# Patient Record
Sex: MALE | Race: WHITE | NOT HISPANIC OR LATINO | Employment: FULL TIME | URBAN - METROPOLITAN AREA
[De-identification: names, ages, dates, MRNs, and addresses within clinical notes are randomized per-mention and may not be internally consistent; named-entity substitution may affect disease eponyms.]

---

## 2017-01-07 DIAGNOSIS — D75.1 SECONDARY POLYCYTHEMIA: ICD-10-CM

## 2017-02-03 ENCOUNTER — APPOINTMENT (OUTPATIENT)
Dept: LAB | Facility: CLINIC | Age: 57
End: 2017-02-03
Payer: COMMERCIAL

## 2017-02-03 DIAGNOSIS — E61.1 IRON DEFICIENCY: ICD-10-CM

## 2017-02-03 DIAGNOSIS — D75.1 SECONDARY POLYCYTHEMIA: ICD-10-CM

## 2017-02-03 LAB
ALBUMIN SERPL BCP-MCNC: 4.2 G/DL (ref 3.5–5)
ALP SERPL-CCNC: 67 U/L (ref 46–116)
ALT SERPL W P-5'-P-CCNC: 27 U/L (ref 12–78)
ANION GAP SERPL CALCULATED.3IONS-SCNC: 7 MMOL/L (ref 4–13)
AST SERPL W P-5'-P-CCNC: 17 U/L (ref 5–45)
BASOPHILS # BLD AUTO: 0.01 THOUSANDS/ΜL (ref 0–0.1)
BASOPHILS NFR BLD AUTO: 0 % (ref 0–1)
BILIRUB SERPL-MCNC: 0.47 MG/DL (ref 0.2–1)
BUN SERPL-MCNC: 13 MG/DL (ref 5–25)
CALCIUM SERPL-MCNC: 9 MG/DL (ref 8.3–10.1)
CHLORIDE SERPL-SCNC: 104 MMOL/L (ref 100–108)
CO2 SERPL-SCNC: 30 MMOL/L (ref 21–32)
CREAT SERPL-MCNC: 1.01 MG/DL (ref 0.6–1.3)
EOSINOPHIL # BLD AUTO: 0.08 THOUSAND/ΜL (ref 0–0.61)
EOSINOPHIL NFR BLD AUTO: 1 % (ref 0–6)
ERYTHROCYTE [DISTWIDTH] IN BLOOD BY AUTOMATED COUNT: 13.6 % (ref 11.6–15.1)
FERRITIN SERPL-MCNC: 12 NG/ML (ref 8–388)
GFR SERPL CREATININE-BSD FRML MDRD: >60 ML/MIN/1.73SQ M
GLUCOSE SERPL-MCNC: 134 MG/DL (ref 65–140)
HCT VFR BLD AUTO: 49.8 % (ref 36.5–49.3)
HGB BLD-MCNC: 17.2 G/DL (ref 12–17)
IRON SATN MFR SERPL: 35 %
IRON SERPL-MCNC: 160 UG/DL (ref 65–175)
LYMPHOCYTES # BLD AUTO: 1.6 THOUSANDS/ΜL (ref 0.6–4.47)
LYMPHOCYTES NFR BLD AUTO: 26 % (ref 14–44)
MCH RBC QN AUTO: 31 PG (ref 26.8–34.3)
MCHC RBC AUTO-ENTMCNC: 34.5 G/DL (ref 31.4–37.4)
MCV RBC AUTO: 90 FL (ref 82–98)
MONOCYTES # BLD AUTO: 0.53 THOUSAND/ΜL (ref 0.17–1.22)
MONOCYTES NFR BLD AUTO: 9 % (ref 4–12)
NEUTROPHILS # BLD AUTO: 4.03 THOUSANDS/ΜL (ref 1.85–7.62)
NEUTS SEG NFR BLD AUTO: 64 % (ref 43–75)
NRBC BLD AUTO-RTO: 0 /100 WBCS
PLATELET # BLD AUTO: 211 THOUSANDS/UL (ref 149–390)
PMV BLD AUTO: 10.9 FL (ref 8.9–12.7)
POTASSIUM SERPL-SCNC: 3.9 MMOL/L (ref 3.5–5.3)
PROT SERPL-MCNC: 7.7 G/DL (ref 6.4–8.2)
RBC # BLD AUTO: 5.54 MILLION/UL (ref 3.88–5.62)
SODIUM SERPL-SCNC: 141 MMOL/L (ref 136–145)
TIBC SERPL-MCNC: 453 UG/DL (ref 250–450)
WBC # BLD AUTO: 6.26 THOUSAND/UL (ref 4.31–10.16)

## 2017-02-03 PROCEDURE — 36415 COLL VENOUS BLD VENIPUNCTURE: CPT

## 2017-02-03 PROCEDURE — 85025 COMPLETE CBC W/AUTO DIFF WBC: CPT

## 2017-02-03 PROCEDURE — 80053 COMPREHEN METABOLIC PANEL: CPT

## 2017-02-03 PROCEDURE — 83540 ASSAY OF IRON: CPT

## 2017-02-03 PROCEDURE — 83550 IRON BINDING TEST: CPT

## 2017-02-03 PROCEDURE — 82728 ASSAY OF FERRITIN: CPT

## 2017-02-28 ENCOUNTER — GENERIC CONVERSION - ENCOUNTER (OUTPATIENT)
Dept: OTHER | Facility: OTHER | Age: 57
End: 2017-02-28

## 2017-02-28 ENCOUNTER — ALLSCRIPTS OFFICE VISIT (OUTPATIENT)
Dept: OTHER | Facility: OTHER | Age: 57
End: 2017-02-28

## 2017-02-28 DIAGNOSIS — E61.1 IRON DEFICIENCY: ICD-10-CM

## 2017-02-28 DIAGNOSIS — D75.1 SECONDARY POLYCYTHEMIA: ICD-10-CM

## 2017-04-07 DIAGNOSIS — E61.1 IRON DEFICIENCY: ICD-10-CM

## 2017-04-07 DIAGNOSIS — D75.1 SECONDARY POLYCYTHEMIA: ICD-10-CM

## 2017-06-28 DIAGNOSIS — D75.1 SECONDARY POLYCYTHEMIA: ICD-10-CM

## 2017-06-28 DIAGNOSIS — E61.1 IRON DEFICIENCY: ICD-10-CM

## 2017-08-27 DIAGNOSIS — D75.1 SECONDARY POLYCYTHEMIA: ICD-10-CM

## 2017-08-27 DIAGNOSIS — E61.1 IRON DEFICIENCY: ICD-10-CM

## 2017-10-26 DIAGNOSIS — E61.1 IRON DEFICIENCY: ICD-10-CM

## 2017-10-26 DIAGNOSIS — D75.1 SECONDARY POLYCYTHEMIA: ICD-10-CM

## 2017-12-25 DIAGNOSIS — D75.1 SECONDARY POLYCYTHEMIA: ICD-10-CM

## 2017-12-25 DIAGNOSIS — E61.1 IRON DEFICIENCY: ICD-10-CM

## 2018-01-14 VITALS
HEIGHT: 67 IN | OXYGEN SATURATION: 98 % | WEIGHT: 158.25 LBS | HEART RATE: 78 BPM | DIASTOLIC BLOOD PRESSURE: 80 MMHG | RESPIRATION RATE: 16 BRPM | TEMPERATURE: 97.6 F | SYSTOLIC BLOOD PRESSURE: 126 MMHG | BODY MASS INDEX: 24.84 KG/M2

## 2018-06-28 ENCOUNTER — LAB (OUTPATIENT)
Dept: LAB | Facility: CLINIC | Age: 58
End: 2018-06-28
Payer: COMMERCIAL

## 2018-06-28 ENCOUNTER — TELEPHONE (OUTPATIENT)
Dept: INTERNAL MEDICINE CLINIC | Facility: CLINIC | Age: 58
End: 2018-06-28

## 2018-06-28 ENCOUNTER — OFFICE VISIT (OUTPATIENT)
Dept: INTERNAL MEDICINE CLINIC | Facility: CLINIC | Age: 58
End: 2018-06-28
Payer: COMMERCIAL

## 2018-06-28 VITALS
WEIGHT: 157 LBS | HEART RATE: 77 BPM | DIASTOLIC BLOOD PRESSURE: 78 MMHG | SYSTOLIC BLOOD PRESSURE: 110 MMHG | BODY MASS INDEX: 24.64 KG/M2 | HEIGHT: 67 IN

## 2018-06-28 DIAGNOSIS — E83.19 IRON OVERLOAD: ICD-10-CM

## 2018-06-28 DIAGNOSIS — R53.83 OTHER FATIGUE: ICD-10-CM

## 2018-06-28 DIAGNOSIS — E53.8 VITAMIN B 12 DEFICIENCY: ICD-10-CM

## 2018-06-28 DIAGNOSIS — R73.01 IMPAIRED FASTING BLOOD SUGAR: ICD-10-CM

## 2018-06-28 DIAGNOSIS — Z13.6 SCREENING FOR CARDIOVASCULAR CONDITION: ICD-10-CM

## 2018-06-28 DIAGNOSIS — Z12.5 SCREENING FOR MALIGNANT NEOPLASM OF PROSTATE: ICD-10-CM

## 2018-06-28 DIAGNOSIS — H91.93 BILATERAL HEARING LOSS, UNSPECIFIED HEARING LOSS TYPE: ICD-10-CM

## 2018-06-28 DIAGNOSIS — Z11.59 NEED FOR HEPATITIS C SCREENING TEST: ICD-10-CM

## 2018-06-28 DIAGNOSIS — Z11.59 NEED FOR HEPATITIS C SCREENING TEST: Primary | ICD-10-CM

## 2018-06-28 DIAGNOSIS — R13.0 APHAGIA: ICD-10-CM

## 2018-06-28 LAB
ALBUMIN SERPL BCP-MCNC: 3.9 G/DL (ref 3.5–5)
ALP SERPL-CCNC: 64 U/L (ref 46–116)
ALT SERPL W P-5'-P-CCNC: 26 U/L (ref 12–78)
ANION GAP SERPL CALCULATED.3IONS-SCNC: 5 MMOL/L (ref 4–13)
AST SERPL W P-5'-P-CCNC: 15 U/L (ref 5–45)
BASOPHILS # BLD AUTO: 0.03 THOUSANDS/ΜL (ref 0–0.1)
BASOPHILS NFR BLD AUTO: 1 % (ref 0–1)
BILIRUB DIRECT SERPL-MCNC: 0.09 MG/DL (ref 0–0.2)
BILIRUB SERPL-MCNC: 0.54 MG/DL (ref 0.2–1)
BUN SERPL-MCNC: 11 MG/DL (ref 5–25)
CALCIUM SERPL-MCNC: 8.8 MG/DL (ref 8.3–10.1)
CHLORIDE SERPL-SCNC: 104 MMOL/L (ref 100–108)
CHOLEST SERPL-MCNC: 176 MG/DL (ref 50–200)
CO2 SERPL-SCNC: 32 MMOL/L (ref 21–32)
CREAT SERPL-MCNC: 1.01 MG/DL (ref 0.6–1.3)
EOSINOPHIL # BLD AUTO: 0.05 THOUSAND/ΜL (ref 0–0.61)
EOSINOPHIL NFR BLD AUTO: 1 % (ref 0–6)
ERYTHROCYTE [DISTWIDTH] IN BLOOD BY AUTOMATED COUNT: 13.2 % (ref 11.6–15.1)
EST. AVERAGE GLUCOSE BLD GHB EST-MCNC: 117 MG/DL
FERRITIN SERPL-MCNC: 7 NG/ML (ref 8–388)
GFR SERPL CREATININE-BSD FRML MDRD: 83 ML/MIN/1.73SQ M
GLUCOSE P FAST SERPL-MCNC: 99 MG/DL (ref 65–99)
HBA1C MFR BLD: 5.7 % (ref 4.2–6.3)
HCT VFR BLD AUTO: 46.6 % (ref 36.5–49.3)
HCV AB SER QL: NORMAL
HDLC SERPL-MCNC: 39 MG/DL (ref 40–60)
HGB BLD-MCNC: 14.9 G/DL (ref 12–17)
IMM GRANULOCYTES # BLD AUTO: 0.01 THOUSAND/UL (ref 0–0.2)
IMM GRANULOCYTES NFR BLD AUTO: 0 % (ref 0–2)
IRON SERPL-MCNC: 65 UG/DL (ref 65–175)
LDH SERPL-CCNC: 174 U/L (ref 81–234)
LDLC SERPL CALC-MCNC: 101 MG/DL (ref 0–100)
LYMPHOCYTES # BLD AUTO: 1.2 THOUSANDS/ΜL (ref 0.6–4.47)
LYMPHOCYTES NFR BLD AUTO: 24 % (ref 14–44)
MCH RBC QN AUTO: 29.4 PG (ref 26.8–34.3)
MCHC RBC AUTO-ENTMCNC: 32 G/DL (ref 31.4–37.4)
MCV RBC AUTO: 92 FL (ref 82–98)
MONOCYTES # BLD AUTO: 0.58 THOUSAND/ΜL (ref 0.17–1.22)
MONOCYTES NFR BLD AUTO: 12 % (ref 4–12)
NEUTROPHILS # BLD AUTO: 3.19 THOUSANDS/ΜL (ref 1.85–7.62)
NEUTS SEG NFR BLD AUTO: 62 % (ref 43–75)
NONHDLC SERPL-MCNC: 137 MG/DL
NRBC BLD AUTO-RTO: 0 /100 WBCS
PLATELET # BLD AUTO: 212 THOUSANDS/UL (ref 149–390)
PMV BLD AUTO: 10.9 FL (ref 8.9–12.7)
POTASSIUM SERPL-SCNC: 4 MMOL/L (ref 3.5–5.3)
PROT SERPL-MCNC: 7.2 G/DL (ref 6.4–8.2)
PSA SERPL-MCNC: 1.3 NG/ML (ref 0–4)
RBC # BLD AUTO: 5.06 MILLION/UL (ref 3.88–5.62)
SODIUM SERPL-SCNC: 141 MMOL/L (ref 136–145)
TIBC SERPL-MCNC: 453 UG/DL (ref 250–450)
TRANSFERRIN SERPL-MCNC: 327 MG/DL (ref 200–400)
TRIGL SERPL-MCNC: 178 MG/DL
TSH SERPL DL<=0.05 MIU/L-ACNC: 1 UIU/ML (ref 0.36–3.74)
VIT B12 SERPL-MCNC: 175 PG/ML (ref 100–900)
WBC # BLD AUTO: 5.06 THOUSAND/UL (ref 4.31–10.16)

## 2018-06-28 PROCEDURE — 83036 HEMOGLOBIN GLYCOSYLATED A1C: CPT

## 2018-06-28 PROCEDURE — 86803 HEPATITIS C AB TEST: CPT

## 2018-06-28 PROCEDURE — 84466 ASSAY OF TRANSFERRIN: CPT

## 2018-06-28 PROCEDURE — 80061 LIPID PANEL: CPT

## 2018-06-28 PROCEDURE — 83615 LACTATE (LD) (LDH) ENZYME: CPT

## 2018-06-28 PROCEDURE — G0103 PSA SCREENING: HCPCS

## 2018-06-28 PROCEDURE — 83918 ORGANIC ACIDS TOTAL QUANT: CPT

## 2018-06-28 PROCEDURE — 80048 BASIC METABOLIC PNL TOTAL CA: CPT

## 2018-06-28 PROCEDURE — 82607 VITAMIN B-12: CPT

## 2018-06-28 PROCEDURE — 3008F BODY MASS INDEX DOCD: CPT | Performed by: NURSE PRACTITIONER

## 2018-06-28 PROCEDURE — 83550 IRON BINDING TEST: CPT

## 2018-06-28 PROCEDURE — 86618 LYME DISEASE ANTIBODY: CPT

## 2018-06-28 PROCEDURE — 36415 COLL VENOUS BLD VENIPUNCTURE: CPT

## 2018-06-28 PROCEDURE — 85025 COMPLETE CBC W/AUTO DIFF WBC: CPT

## 2018-06-28 PROCEDURE — 82728 ASSAY OF FERRITIN: CPT

## 2018-06-28 PROCEDURE — 84443 ASSAY THYROID STIM HORMONE: CPT

## 2018-06-28 PROCEDURE — 83540 ASSAY OF IRON: CPT

## 2018-06-28 PROCEDURE — 80076 HEPATIC FUNCTION PANEL: CPT

## 2018-06-28 PROCEDURE — 99204 OFFICE O/P NEW MOD 45 MIN: CPT | Performed by: NURSE PRACTITIONER

## 2018-06-28 NOTE — PROGRESS NOTES
Assessment/Plan:    Patient Instructions   Iron overload has not seen Hematology in over a year but he has been phlebotomized every 2-3 months check labs as noted    Fatigue check labs follow-up subsequently    15 min episode of visual change with aphasia although this occurred after he was exposed to bright flashing lights he states he has history of same as an adolescent he personally does not have history of migraines but his father did  Will still need to rule out CVA versus other so we will check MRI follow-up subsequently I recommend baby aspirin patient states causes GI upset he is doing homeopathic remedy    Hearing loss and change in voice refer to ENT    Allergic rhinitis postnasal drip this may be contributing to his change in voice and inability to sing recommend Claritin         Diagnoses and all orders for this visit:    Need for hepatitis C screening test  -     Hepatitis C antibody; Future  -     MRI brain wo contrast; Future    Aphagia  -     MRI brain wo contrast; Future    Vitamin B 12 deficiency  -     Methylmalonic acid, serum; Future  -     Vitamin B12; Future    Screening for cardiovascular condition  -     Lipid panel; Future    Impaired fasting blood sugar  -     Hemoglobin A1C; Future    Other fatigue  -     CBC and differential; Future  -     TSH, 3rd generation; Future  -     Lyme Antibody Profile with reflex to WB; Future    Iron overload  -     CBC and differential; Future  -     Hepatic function panel; Future  -     Basic metabolic panel; Future  -     LD,Blood; Future  -     Iron; Future  -     Ferritin; Future  -     Transferrin; Future  -     TIBC; Future    Screening for malignant neoplasm of prostate  -     PSA, Total Screen; Future    Bilateral hearing loss, unspecified hearing loss type  -     Ambulatory Referral to Otolaryngology;  Future         Subjective:      Patient ID: Cb Hearn is a 56 y o  male    Patient has not been feeling well for over a year he regionally thought it was because 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 but now he is concerned that it is more than this  He just again feels drained paced  He has history of iron overload has not seen Hematology in over a year but he is being phlebotomized every 2-3 months  Several months ago he had a case of laryngitis without any specific sickness symptoms just inability to talk  His voice has come back but he notes that he cannot sing  He is not able to hit the notes that he wants could  Additionally he notes a change in his hearing he has a hard time hearing high pitched voices  No ringing in the ears  No headaches  About a week or so ago he was at school and they were watching a video that at the end of the video a contained bright flashing lights  While he was watching the video he had a change in his vision where his peripheral vision got we have the  He notes none of the students had the same reaction  Then he notes he was looking at the computer screen and he saw a word but he was not able to speak the were to himself  He got concerned and he tried to say the word out loud and it came out as garbled  He lay down and his symptoms resolved it lasted a total of 15 min  He states he had 2 episodes like this in his lifetime 1 as an adolescent and another during his adult heard  He cannot recall the circumstances surrounding but he just remembers it definitely happened  He does not have history of seizure disorder or personal history of migraine but his father had history of migraines and had similar symptoms  No current outpatient prescriptions on file  No results found for this or any previous visit (from the past 1008 hour(s))      The following portions of the patient's history were reviewed and updated as appropriate: allergies, current medications, past family history, past medical history, past social history, past surgical history and problem list  Review of Systems   Constitutional: Positive for fatigue  Negative for appetite change, chills, diaphoresis, fever and unexpected weight change  HENT: Positive for postnasal drip  Negative for sneezing  Eyes: Negative for visual disturbance  Respiratory: Negative for chest tightness and shortness of breath  Cardiovascular: Negative for chest pain, palpitations and leg swelling  Gastrointestinal: Negative for abdominal pain and blood in stool  Endocrine: Negative for cold intolerance, heat intolerance, polydipsia, polyphagia and polyuria  Genitourinary: Negative for difficulty urinating, dysuria, frequency and urgency  Musculoskeletal: Negative for arthralgias and myalgias  Skin: Negative for rash and wound  Neurological: Positive for speech difficulty  Negative for dizziness, weakness, light-headedness and headaches  Hematological: Negative for adenopathy  Psychiatric/Behavioral: Negative for confusion, dysphoric mood and sleep disturbance  The patient is not nervous/anxious  Objective:      Vitals:    06/28/18 0843   BP: 110/78   Pulse: 77          Physical Exam   Constitutional: He is oriented to person, place, and time  He appears well-developed  No distress  HENT:   Head: Normocephalic and atraumatic  Nose: Nose normal    Mouth/Throat: Oropharynx is clear and moist    Cobblestoning noted   Eyes: Conjunctivae and EOM are normal  Pupils are equal, round, and reactive to light  Neck: Normal range of motion  Neck supple  No JVD present  No tracheal deviation present  No thyromegaly present  Cardiovascular: Normal rate, regular rhythm, normal heart sounds and intact distal pulses  Exam reveals no gallop and no friction rub  No murmur heard  Pulmonary/Chest: Effort normal and breath sounds normal  No respiratory distress  He has no wheezes  He has no rales  Abdominal: Soft  Bowel sounds are normal  He exhibits no distension  There is no tenderness  Musculoskeletal: Normal range of motion  He exhibits no edema  Lymphadenopathy:     He has no cervical adenopathy  Neurological: He is alert and oriented to person, place, and time  No cranial nerve deficit  Skin: Skin is warm and dry  No rash noted  He is not diaphoretic  Psychiatric: He has a normal mood and affect   His behavior is normal  Judgment and thought content normal

## 2018-06-29 LAB
B BURGDOR IGG SER IA-ACNC: 0.24
B BURGDOR IGM SER IA-ACNC: 0.45

## 2018-06-30 LAB
METHYLMALONATE SERPL-SCNC: 245 NMOL/L (ref 0–378)
SL AMB DISCLAIMER: NORMAL

## 2018-07-02 NOTE — PROGRESS NOTES
All labs normal  Excpet his has early iron deficiency so he needs to donate less frequently  Also he has early b 12 def, I recommend vitamin b12 1000mcg under the tongue daily     Very important that he gets the under the tongue, the body may not absorb it if he gets the tablet that he swallows

## 2018-07-03 ENCOUNTER — TELEPHONE (OUTPATIENT)
Dept: INTERNAL MEDICINE CLINIC | Facility: CLINIC | Age: 58
End: 2018-07-03

## 2018-07-03 NOTE — TELEPHONE ENCOUNTER
----- Message from ADRIENNE Crowley sent at 7/2/2018 12:44 PM EDT -----  All labs normal  Excpet his has early iron deficiency so he needs to donate less frequently  Also he has early b 12 def, I recommend vitamin b12 1000mcg under the tongue daily     Very important that he gets the under the tongue, the body may not absorb it if he gets the tablet that he swallows

## 2018-08-20 ENCOUNTER — TELEPHONE (OUTPATIENT)
Dept: INTERNAL MEDICINE CLINIC | Facility: CLINIC | Age: 58
End: 2018-08-20

## 2018-08-20 NOTE — TELEPHONE ENCOUNTER
Pt would like a call back he feels that there may be an issue with his meds not mixing well please call him at [phone]

## 2018-08-20 NOTE — TELEPHONE ENCOUNTER
PT  CALLED BACK AND STATED HAS 2ND POLYCYTHEMIA AND VITAMIN B12 DEFICIENCY, FEELS SHE SHOULD NOT BE TAKING VITAMIN B12 SUPPLEMENT AS MAKES BLOOD TO THICK , HE LOOKED THIS UP ON LINE IN [facility]

## 2018-08-20 NOTE — TELEPHONE ENCOUNTER
He doesn't have to take the vitamin b 12  He had early vitamin b 12 def normal is 100-900, in the setting of fatigue we recommend replacment   But he doesn't have to take the supplement

## 2018-10-31 ENCOUNTER — TELEPHONE (OUTPATIENT)
Dept: INTERNAL MEDICINE CLINIC | Facility: CLINIC | Age: 58
End: 2018-10-31

## 2018-10-31 DIAGNOSIS — Z11.1 SCREENING-PULMONARY TB: Primary | ICD-10-CM

## 2018-11-01 ENCOUNTER — OFFICE VISIT (OUTPATIENT)
Dept: INTERNAL MEDICINE CLINIC | Facility: CLINIC | Age: 58
End: 2018-11-01
Payer: COMMERCIAL

## 2018-11-01 VITALS
DIASTOLIC BLOOD PRESSURE: 74 MMHG | WEIGHT: 151.6 LBS | RESPIRATION RATE: 12 BRPM | HEART RATE: 92 BPM | BODY MASS INDEX: 23.79 KG/M2 | HEIGHT: 67 IN | SYSTOLIC BLOOD PRESSURE: 128 MMHG

## 2018-11-01 DIAGNOSIS — G45.9 TIA (TRANSIENT ISCHEMIC ATTACK): ICD-10-CM

## 2018-11-01 DIAGNOSIS — D75.1 SECONDARY POLYCYTHEMIA: ICD-10-CM

## 2018-11-01 DIAGNOSIS — Z00.00 WELL ADULT EXAM: Primary | ICD-10-CM

## 2018-11-01 DIAGNOSIS — E78.2 MIXED HYPERLIPIDEMIA: ICD-10-CM

## 2018-11-01 PROCEDURE — 86580 TB INTRADERMAL TEST: CPT

## 2018-11-01 PROCEDURE — 99396 PREV VISIT EST AGE 40-64: CPT | Performed by: INTERNAL MEDICINE

## 2018-11-01 NOTE — PATIENT INSTRUCTIONS
Healthy appearing 57-year-old male, lab data from June reviewed and discussed    History of polycythemia has been stable with intermittent phlebotomy, cost has been prohibitive with regards to hematologic follow-up  Will follow-up CBC in December and plan to follow at least twice yearly with iron panels as needed  Neurologic symptoms back in June consistent with TIA-patient was unable to afford the MRI, symptoms have resolved  Symptoms were attributed to excessive fatigue during that time frame  There is no focality to neurologic exam today  Blood pressure is normal, cholesterol is borderline, there is no evidence for diabetes or heart rhythm disturbance  At this time patient would like to monitor symptoms rather than pursue aggressive workup  We did discuss borderline vitamin B12 deficiency low in my opinion this should not cause acute symptomatology  PPD placed today to be read in 48 hr, school physical form completed      Follow-up after labs in December, sooner as needed

## 2018-11-01 NOTE — PROGRESS NOTES
Assessment/Plan:    Patient Instructions   Healthy appearing 68-year-old male, lab data from June reviewed and discussed    History of polycythemia has been stable with intermittent phlebotomy, cost has been prohibitive with regards to hematologic follow-up  Will follow-up CBC in December and plan to follow at least twice yearly with iron panels as needed  Neurologic symptoms back in June consistent with TIA-patient was unable to afford the MRI, symptoms have resolved  Symptoms were attributed to excessive fatigue during that time frame  There is no focality to neurologic exam today  Blood pressure is normal, cholesterol is borderline, there is no evidence for diabetes or heart rhythm disturbance  At this time patient would like to monitor symptoms rather than pursue aggressive workup  We did discuss borderline vitamin B12 deficiency low in my opinion this should not cause acute symptomatology  PP placed today to be read in 48 hr, school physical form completed  Diagnoses and all orders for this visit:    Well adult exam    Secondary polycythemia    TIA (transient ischemic attack)    Mixed hyperlipidemia         Subjective:      Patient ID: Maria Isabel Cano is a 66 y o  male    Never did MRI in June d/t cost    No further speech, vision, or language issues  Balance was off for a few mos, but now fine  He attributed sx to b12 def, but levels were nl  He took supplement b12 for a while, but then stopped d/t polycythemia  Stopped donating blood, but had been every 8-12 weeks  Stopped asa d/t stomach upset  No current outpatient prescriptions on file  No results found for this or any previous visit (from the past 1008 hour(s))      The following portions of the patient's history were reviewed and updated as appropriate: allergies, current medications, past family history, past medical history, past social history, past surgical history and problem list      Review of Systems Constitutional: Negative for appetite change, chills, diaphoresis, fatigue, fever and unexpected weight change  HENT: Negative for congestion, hearing loss and rhinorrhea  Eyes: Negative for visual disturbance  Respiratory: Negative for cough, chest tightness, shortness of breath and wheezing  Cardiovascular: Negative for chest pain, palpitations and leg swelling  Gastrointestinal: Negative for abdominal pain and blood in stool  Endocrine: Negative for cold intolerance, heat intolerance, polydipsia and polyuria  Genitourinary: Negative for difficulty urinating, dysuria, frequency and urgency  Musculoskeletal: Negative for arthralgias and myalgias  Skin: Negative for rash  Neurological: Negative for dizziness, weakness, light-headedness and headaches  Hematological: Does not bruise/bleed easily  Psychiatric/Behavioral: Negative for dysphoric mood and sleep disturbance  Objective:      Vitals:    11/01/18 1023   BP: 128/74   Pulse: 92   Resp: 12          Physical Exam   Constitutional: He is oriented to person, place, and time  He appears well-developed and well-nourished  HENT:   Head: Normocephalic and atraumatic  Nose: Nose normal    Mouth/Throat: Oropharynx is clear and moist    Eyes: Pupils are equal, round, and reactive to light  Conjunctivae and EOM are normal  No scleral icterus  Neck: Normal range of motion  Neck supple  No JVD present  No tracheal deviation present  No thyromegaly present  Cardiovascular: Normal rate, regular rhythm and intact distal pulses  Exam reveals no gallop and no friction rub  No murmur heard  Pulmonary/Chest: Effort normal and breath sounds normal  No respiratory distress  He has no wheezes  He has no rales  Abdominal: Soft  Bowel sounds are normal  He exhibits no distension and no mass  There is no tenderness  There is no rebound and no guarding  Musculoskeletal: He exhibits no edema or tenderness     Lymphadenopathy:     He has no cervical adenopathy  Neurological: He is alert and oriented to person, place, and time  No cranial nerve deficit  Cerebellar exam intact rapid alternating movement, finger-to-nose, negative Romberg, no pronator drift, normal tandem gait   Skin: Skin is warm and dry  No rash noted  No erythema  Psychiatric: He has a normal mood and affect   His behavior is normal  Judgment and thought content normal

## 2018-11-03 LAB
INDURATION: 0 MM
TB SKIN TEST: NEGATIVE

## 2020-02-24 ENCOUNTER — TELEPHONE (OUTPATIENT)
Dept: INTERNAL MEDICINE CLINIC | Facility: CLINIC | Age: 60
End: 2020-02-24

## 2020-12-31 ENCOUNTER — HOSPITAL ENCOUNTER (EMERGENCY)
Facility: HOSPITAL | Age: 60
Discharge: HOME | End: 2020-12-31
Attending: EMERGENCY MEDICINE
Payer: COMMERCIAL

## 2020-12-31 ENCOUNTER — APPOINTMENT (EMERGENCY)
Dept: RADIOLOGY | Facility: HOSPITAL | Age: 60
End: 2020-12-31
Payer: COMMERCIAL

## 2020-12-31 VITALS
TEMPERATURE: 97.7 F | WEIGHT: 155 LBS | OXYGEN SATURATION: 98 % | HEART RATE: 75 BPM | BODY MASS INDEX: 24.33 KG/M2 | DIASTOLIC BLOOD PRESSURE: 70 MMHG | HEIGHT: 67 IN | SYSTOLIC BLOOD PRESSURE: 118 MMHG | RESPIRATION RATE: 13 BRPM

## 2020-12-31 DIAGNOSIS — R00.2 PALPITATIONS: Primary | ICD-10-CM

## 2020-12-31 LAB
ALBUMIN SERPL BCP-MCNC: 4 G/DL (ref 3.5–5)
ALP SERPL-CCNC: 64 U/L (ref 46–116)
ALT SERPL W P-5'-P-CCNC: 59 U/L (ref 12–78)
ANION GAP SERPL CALCULATED.3IONS-SCNC: 8 MMOL/L (ref 4–13)
AST SERPL W P-5'-P-CCNC: 30 U/L (ref 5–45)
ATRIAL RATE: 93 BPM
BASOPHILS # BLD AUTO: 0.01 THOUSANDS/ΜL (ref 0–0.1)
BASOPHILS NFR BLD AUTO: 0 % (ref 0–1)
BILIRUB SERPL-MCNC: 0.5 MG/DL (ref 0.2–1)
BUN SERPL-MCNC: 13 MG/DL (ref 5–25)
CALCIUM SERPL-MCNC: 9.1 MG/DL (ref 8.3–10.1)
CHLORIDE SERPL-SCNC: 105 MMOL/L (ref 100–108)
CO2 SERPL-SCNC: 29 MMOL/L (ref 21–32)
CREAT SERPL-MCNC: 1.05 MG/DL (ref 0.6–1.3)
EOSINOPHIL # BLD AUTO: 0.04 THOUSAND/ΜL (ref 0–0.61)
EOSINOPHIL NFR BLD AUTO: 1 % (ref 0–6)
ERYTHROCYTE [DISTWIDTH] IN BLOOD BY AUTOMATED COUNT: 12.2 % (ref 11.6–15.1)
GFR SERPL CREATININE-BSD FRML MDRD: 78 ML/MIN/1.73SQ M
GLUCOSE SERPL-MCNC: 87 MG/DL (ref 65–140)
HCT VFR BLD AUTO: 51.1 % (ref 36.5–49.3)
HGB BLD-MCNC: 17.2 G/DL (ref 12–17)
IMM GRANULOCYTES # BLD AUTO: 0.02 THOUSAND/UL (ref 0–0.2)
IMM GRANULOCYTES NFR BLD AUTO: 0 % (ref 0–2)
LYMPHOCYTES # BLD AUTO: 1.08 THOUSANDS/ΜL (ref 0.6–4.47)
LYMPHOCYTES NFR BLD AUTO: 22 % (ref 14–44)
MAGNESIUM SERPL-MCNC: 2.1 MG/DL (ref 1.6–2.6)
MCH RBC QN AUTO: 31.6 PG (ref 26.8–34.3)
MCHC RBC AUTO-ENTMCNC: 33.7 G/DL (ref 31.4–37.4)
MCV RBC AUTO: 94 FL (ref 82–98)
MONOCYTES # BLD AUTO: 0.35 THOUSAND/ΜL (ref 0.17–1.22)
MONOCYTES NFR BLD AUTO: 7 % (ref 4–12)
NEUTROPHILS # BLD AUTO: 3.35 THOUSANDS/ΜL (ref 1.85–7.62)
NEUTS SEG NFR BLD AUTO: 70 % (ref 43–75)
NRBC BLD AUTO-RTO: 0 /100 WBCS
P AXIS: 68 DEGREES
PHOSPHATE SERPL-MCNC: 2.8 MG/DL (ref 2.7–4.5)
PLATELET # BLD AUTO: 191 THOUSANDS/UL (ref 149–390)
PMV BLD AUTO: 10 FL (ref 8.9–12.7)
POTASSIUM SERPL-SCNC: 4.1 MMOL/L (ref 3.5–5.3)
PR INTERVAL: 138 MS
PROT SERPL-MCNC: 7.5 G/DL (ref 6.4–8.2)
QRS AXIS: 51 DEGREES
QRSD INTERVAL: 82 MS
QT INTERVAL: 352 MS
QTC INTERVAL: 437 MS
RBC # BLD AUTO: 5.45 MILLION/UL (ref 3.88–5.62)
SODIUM SERPL-SCNC: 142 MMOL/L (ref 136–145)
T WAVE AXIS: 37 DEGREES
TROPONIN I SERPL-MCNC: <0.02 NG/ML
VENTRICULAR RATE: 93 BPM
WBC # BLD AUTO: 4.85 THOUSAND/UL (ref 4.31–10.16)

## 2020-12-31 PROCEDURE — 96361 HYDRATE IV INFUSION ADD-ON: CPT

## 2020-12-31 PROCEDURE — 99285 EMERGENCY DEPT VISIT HI MDM: CPT

## 2020-12-31 PROCEDURE — 84484 ASSAY OF TROPONIN QUANT: CPT | Performed by: EMERGENCY MEDICINE

## 2020-12-31 PROCEDURE — 99285 EMERGENCY DEPT VISIT HI MDM: CPT | Performed by: EMERGENCY MEDICINE

## 2020-12-31 PROCEDURE — 93005 ELECTROCARDIOGRAM TRACING: CPT

## 2020-12-31 PROCEDURE — 96360 HYDRATION IV INFUSION INIT: CPT

## 2020-12-31 PROCEDURE — 80053 COMPREHEN METABOLIC PANEL: CPT | Performed by: EMERGENCY MEDICINE

## 2020-12-31 PROCEDURE — 83735 ASSAY OF MAGNESIUM: CPT | Performed by: EMERGENCY MEDICINE

## 2020-12-31 PROCEDURE — 84100 ASSAY OF PHOSPHORUS: CPT | Performed by: EMERGENCY MEDICINE

## 2020-12-31 PROCEDURE — 93010 ELECTROCARDIOGRAM REPORT: CPT | Performed by: INTERNAL MEDICINE

## 2020-12-31 PROCEDURE — 71046 X-RAY EXAM CHEST 2 VIEWS: CPT

## 2020-12-31 PROCEDURE — 85025 COMPLETE CBC W/AUTO DIFF WBC: CPT | Performed by: EMERGENCY MEDICINE

## 2020-12-31 PROCEDURE — 36415 COLL VENOUS BLD VENIPUNCTURE: CPT | Performed by: EMERGENCY MEDICINE

## 2020-12-31 RX ADMIN — SODIUM CHLORIDE 1000 ML: 0.9 INJECTION, SOLUTION INTRAVENOUS at 09:00

## 2020-12-31 NOTE — ED PROVIDER NOTES
History  Chief Complaint   Patient presents with    Rapid Heart Rate     recent change      Patient is a 61-year-old male past medical history of polycythemia, hyperlipidemia, TIA presenting for palpitations  Patient states over the last 4 days he has been experiencing palpitations which seem to come when he is standing but states that these do not seem exertional in nature and that nothing seems to make them worse however they seem to better with lying down  He states that duration is variable anywhere from several seconds to sometimes quite a while  He has not started any new medications, has been eating and drinking normally, denies any drug, alcohol, excessive caffeine use  He denies any associated chest pain, shortness breath, dizziness, fevers, nausea vomiting, rashes, vision changes, dysuria, upper respiratory symptoms, cough  He does state that he has not had any phlebotomy in 6 months and normally donates blood every 2 months to control his polycythemia however he was unable to find open donation site  None       Past Medical History:   Diagnosis Date    Iron overload        Past Surgical History:   Procedure Laterality Date    SKIN BIOPSY      last assessed 9/8/14       Family History   Problem Relation Age of Onset    Hypertension Mother     Coronary artery disease Father     Hypertension Father     Hypertension Brother      I have reviewed and agree with the history as documented  E-Cigarette/Vaping    E-Cigarette Use Never User      E-Cigarette/Vaping Substances    Nicotine No     THC No     CBD No     Flavoring No     Other No     Unknown No      Social History     Tobacco Use    Smoking status: Never Smoker    Smokeless tobacco: Never Used   Substance Use Topics    Alcohol use: No    Drug use: No       Review of Systems   All other systems reviewed and are negative  Physical Exam  Physical Exam  Vitals signs reviewed     Constitutional:       General: He is not in acute distress  Appearance: Normal appearance  He is not ill-appearing  HENT:      Mouth/Throat:      Mouth: Mucous membranes are moist    Eyes:      Conjunctiva/sclera: Conjunctivae normal    Neck:      Musculoskeletal: Neck supple  Cardiovascular:      Rate and Rhythm: Normal rate and regular rhythm  Heart sounds: Normal heart sounds  Pulmonary:      Effort: Pulmonary effort is normal       Breath sounds: Normal breath sounds  Abdominal:      General: Abdomen is flat  Palpations: Abdomen is soft  Tenderness: There is no abdominal tenderness  Musculoskeletal: Normal range of motion  General: No swelling or tenderness  Right lower leg: No edema  Left lower leg: No edema  Skin:     General: Skin is warm and dry  Neurological:      General: No focal deficit present  Mental Status: He is alert     Psychiatric:         Mood and Affect: Mood normal          Vital Signs  ED Triage Vitals [12/31/20 0733]   Temperature Pulse Respirations Blood Pressure SpO2   97 7 °F (36 5 °C) 79 12 135/78 100 %      Temp Source Heart Rate Source Patient Position - Orthostatic VS BP Location FiO2 (%)   Oral Monitor Sitting Right arm --      Pain Score       --           Vitals:    12/31/20 0733 12/31/20 0815 12/31/20 0910 12/31/20 1033   BP: 135/78  116/71 118/70   Pulse: 79 72 70 75   Patient Position - Orthostatic VS: Sitting  Sitting Sitting         Visual Acuity      ED Medications  Medications   sodium chloride 0 9 % bolus 1,000 mL (0 mL Intravenous Stopped 12/31/20 1032)       Diagnostic Studies  Results Reviewed     Procedure Component Value Units Date/Time    Troponin I [[id number]]  (Normal) Collected: 12/31/20 0859    Lab Status: Final result Specimen: Blood from Arm, Right Updated: 12/31/20 0934     Troponin I <0 02 ng/mL     Phosphorus [[id number]]  (Normal) Collected: 12/31/20 0859    Lab Status: Final result Specimen: Blood from Arm, Right Updated: 12/31/20 9141 Phosphorus 2 8 mg/dL     Magnesium [[id number]]  (Normal) Collected: 12/31/20 0859    Lab Status: Final result Specimen: Blood from Arm, Right Updated: 12/31/20 0931     Magnesium 2 1 mg/dL     Comprehensive metabolic panel [[id number]] Collected: 12/31/20 0859    Lab Status: Final result Specimen: Blood from Arm, Right Updated: 12/31/20 0930     Sodium 142 mmol/L      Potassium 4 1 mmol/L      Chloride 105 mmol/L      CO2 29 mmol/L      ANION GAP 8 mmol/L      BUN 13 mg/dL      Creatinine 1 05 mg/dL      Glucose 87 mg/dL      Calcium 9 1 mg/dL      AST 30 U/L      ALT 59 U/L      Alkaline Phosphatase 64 U/L      Total Protein 7 5 g/dL      Albumin 4 0 g/dL      Total Bilirubin 0 50 mg/dL      eGFR 78 ml/min/1 73sq m     Narrative:      [place] guidelines for Chronic Kidney Disease (CKD):     Stage 1 with normal or high GFR (GFR > 90 mL/min/1 73 square meters)    Stage 2 Mild CKD (GFR = 60-89 mL/min/1 73 square meters)    Stage 3A Moderate CKD (GFR = 45-59 mL/min/1 73 square meters)    Stage 3B Moderate CKD (GFR = 30-44 mL/min/1 73 square meters)    Stage 4 Severe CKD (GFR = 15-29 mL/min/1 73 square meters)    Stage 5 End Stage CKD (GFR <15 mL/min/1 73 square meters)  Note: GFR calculation is accurate only with a steady state creatinine    CBC and differential [[id number]]  (Abnormal) Collected: 12/31/20 0859    Lab Status: Final result Specimen: Blood from Arm, Right Updated: 12/31/20 0906     WBC 4 85 Thousand/uL      RBC 5 45 Million/uL      Hemoglobin 17 2 g/dL      Hematocrit 51 1 %      MCV 94 fL      MCH 31 6 pg      MCHC 33 7 g/dL      RDW 12 2 %      MPV 10 0 fL      Platelets 593 Thousands/uL      nRBC 0 /100 WBCs      Neutrophils Relative 70 %      Immat GRANS % 0 %      Lymphocytes Relative 22 %      Monocytes Relative 7 %      Eosinophils Relative 1 %      Basophils Relative 0 %      Neutrophils Absolute 3 35 Thousands/µL      Immature Grans Absolute 0 02 Thousand/uL Lymphocytes Absolute 1 08 Thousands/µL      Monocytes Absolute 0 35 Thousand/µL      Eosinophils Absolute 0 04 Thousand/µL      Basophils Absolute 0 01 Thousands/µL                  XR chest 2 views   Final Result by Bethene Lesches, MD (12/31 0121)      No acute cardiopulmonary disease  Workstation performed: [id number]                    Procedures  ECG 12 Lead Documentation Only    Date/Time: 12/31/2020 8:01 AM  Performed by: Mauricio Calvert DO  Authorized by: Mauricio Calvert DO     ECG reviewed by me, the ED Provider: yes    Patient location:  ED  Previous ECG:     Previous ECG:  Unavailable  Interpretation:     Interpretation: normal    Rate:     ECG rate assessment: normal    Rhythm:     Rhythm: sinus rhythm    Ectopy:     Ectopy: PVCs      PVCs:  Infrequent  QRS:     QRS axis:  Normal    QRS intervals:  Normal  Conduction:     Conduction: normal    ST segments:     ST segments:  Normal  T waves:     T waves: inverted      Inverted:  III             ED Course  ED Course as of Dec 31 1444   Thu Dec 31, 2020   0943 Workup remarkable only for mildly elevated hemoglobin  Will advise patient to report blood donation center in [facility] which is open tomorrow for donation however as he is not reporting any chest pain, shortness of breath with negative workup after 4 days of palpitations do not feel that he requires further evaluation in the ED and will advise cardiology follow-up  Will discussed return precautions prior to discharge  SBIRT 22yo+      Most Recent Value   SBIRT (22 yo +)   In order to provide better care to our patients, we are screening all of our patients for alcohol and drug use  Would it be okay to ask you these screening questions? Yes Filed at: 12/31/2020 0741   Initial Alcohol Screen: US AUDIT-C    1  How often do you have a drink containing alcohol? 1 Filed at: 12/31/2020 0741   2   How many drinks containing alcohol do you have on a typical day you are drinking? 0 Filed at: 12/31/2020 0741   3a  Male UNDER 65: How often do you have five or more drinks on one occasion? 1 Filed at: 12/31/2020 0741   Audit-C Score  2 Filed at: 12/31/2020 5828   JOE: How many times in the past year have you    Used an illegal drug or used a prescription medication for non-medical reasons? Never Filed at: 12/31/2020 0741                    MDM  Number of Diagnoses or Management Options  Diagnosis management comments: Patient is a 73-year-old male past medical history of polycythemia, hyperlipidemia, TIA presenting for palpitations  Patient is well-appearing bedside stable vitals and in no acute distress  Initial EKG shows occasional PVCs but is otherwise unremarkable  Patient has no significant physical exam findings  Will obtain cardiac workup and continue to monitor, have advised patient that if he feels sensation of palpitations which he states he is not experiencing now to alert his nurse that we may obtain an EKG  Disposition  Final diagnoses:   Palpitations     Time reflects when diagnosis was documented in both MDM as applicable and the Disposition within this note     Time User Action Codes Description Comment    12/31/2020  9:45 AM Mehul Liu Add [R00 2] Palpitations       ED Disposition     ED Disposition Condition Date/Time Comment    Discharge Stable u Dec 31, 2020  9:45 AM Kymberly Koehler discharge to home/self care              Follow-up Information     Follow up With Specialties Details Why Contact Info Additional [facility] Cardiology Schedule an appointment as soon as possible for a visit   [address] [address] [phone] [facility] Cardiology [place] [facility], [place] [address], [facility], [address] [facility], [address] [phone]    Sasha Gaspar MD Internal Medicine, [address] In 1 week  [address] [place] [address] Level, [place] [address] [address]  [phone]             There are no discharge medications for this patient  No discharge procedures on file      PDMP Review     None          ED Provider  Electronically Signed by           Latha Hatfield DO  12/31/20 2186

## 2020-12-31 NOTE — DISCHARGE INSTRUCTIONS
Please report tomorrow or the day after to [facility] [address] [facility], [address] [place] [address] for donation and please return to the emergency department if you experience the following:    Chest pain  Difficulty breathing  Uncontrollable vomiting  Lightheadedness/passing out  Fevers

## 2021-01-05 ENCOUNTER — OFFICE VISIT (OUTPATIENT)
Dept: CARDIOLOGY CLINIC | Facility: CLINIC | Age: 61
End: 2021-01-05

## 2021-01-05 VITALS
WEIGHT: 156.31 LBS | SYSTOLIC BLOOD PRESSURE: 110 MMHG | HEIGHT: 67 IN | DIASTOLIC BLOOD PRESSURE: 72 MMHG | HEART RATE: 64 BPM | BODY MASS INDEX: 24.53 KG/M2

## 2021-01-05 DIAGNOSIS — D75.1 SECONDARY POLYCYTHEMIA: ICD-10-CM

## 2021-01-05 DIAGNOSIS — I49.3 PVC'S (PREMATURE VENTRICULAR CONTRACTIONS): Primary | ICD-10-CM

## 2021-01-05 PROCEDURE — 99203 OFFICE O/P NEW LOW 30 MIN: CPT | Performed by: INTERNAL MEDICINE

## 2021-01-05 NOTE — ASSESSMENT & PLAN NOTE
Seems likely to have been intermittent for years but more prominent recently  High likelihood of this being benign as there has been no change in exertional capacity, no chest pain, no syncope or near syncope  Nonetheless I would be in favor of an echocardiogram although there is no rush

## 2021-01-05 NOTE — PROGRESS NOTES
Patient ID: Valencia Richter is a 64 y o  male  Plan:      PVC's (premature ventricular contractions)  Seems likely to have been intermittent for years but more prominent recently  High likelihood of this being benign as there has been no change in exertional capacity, no chest pain, no syncope or near syncope  Nonetheless I would be in favor of an echocardiogram although there is no rush  Secondary polycythemia  He has skipped the last few phlebotomies and ought to get back on schedule  Follow up Plan/Other summary comments:  Patient will [place] of [place] in [place]  Mainly he wanted reassurance regarding the skipped beats  Again I told him that this seems benign but at some point even after he moves he ought to arrange for an echocardiogram     HPI:  Patient is seen today regarding palpitations  For the past few weeks he has noted a strong extra beat in his throat  He became worried about this and went to the emergency room  There he was found to have PVCs but normal electrolytes and otherwise normal EKG  With continued concern he decided to see me today  He does not drink alcohol or caffeine to excess  He does not have stop daytime sleepiness  There has been no recent change in exertional capacity  Certainly no chest pain, chest pressure, syncope or near syncope  Is no family history of early CAD  Most recent or relevant cardiac/vascular testing:    I reviewed the EKG from 12/31/2020  This reveals sinus rhythm with PVCs    Otherwise normal       Past Surgical History:   Procedure Laterality Date    SKIN BIOPSY      last assessed 9/8/14     CMP:   Lab Results   Component Value Date     01/06/2016    K 4 1 12/31/2020    K 3 7 01/06/2016     12/31/2020     01/06/2016    CO2 29 12/31/2020    CO2 31 01/06/2016    BUN 13 12/31/2020    BUN 9 01/06/2016    CREATININE 1 05 12/31/2020    CREATININE 0 92 01/06/2016    GLUCOSE 147 (H) 01/06/2016    EGFR 78 12/31/2020       Lipid Profile:   Lab Results   Component Value Date    CHOL 208 09/18/2014    TRIG 178 (H) 06/28/2018    TRIG 105 09/18/2014    HDL 39 (L) 06/28/2018    HDL 52 09/18/2014         Review of Systems   10  point ROS  was otherwise non pertinent or negative except as per HPI or as below  Gait: Normal         Objective:     /72   Pulse 64   Ht 5' 7" (1 702 m)   Wt 70 9 kg (156 lb 4 9 oz)   BMI 24 48 kg/m²     PHYSICAL EXAM:    General:  Normal appearance in no distress  Eyes:  Anicteric  Oral mucosa:  Moist   Neck:  No JVD  Carotid upstrokes are brisk without bruits  No masses  Chest:  Clear to auscultation and percussion  Cardiac:  No palpable PMI  Normal S1 and S2  No murmur gallop or rub  Abdomen:  Soft and nontender  No palpable organomegaly or aortic enlargement  Extremities:  No peripheral edema  Musculoskeletal:  Symmetric  Vascular:  Femoral pulses are brisk without bruits  Popliteal pulses are intact bilaterally  Pedal pulses are intact  Neuro:  Grossly symmetric  Psych:  Alert and oriented x3  No current outpatient medications on file    No Known Allergies  Past Medical History:   Diagnosis Date    Hypertension     Iron overload            Social History     Tobacco Use   Smoking Status Never Smoker   Smokeless Tobacco Never Used

## 2022-08-09 ENCOUNTER — APPOINTMENT (RX ONLY)
Dept: URBAN - METROPOLITAN AREA OTHER 10 | Facility: OTHER | Age: 62
Setting detail: DERMATOLOGY
End: 2022-08-09

## 2022-08-09 DIAGNOSIS — D22 MELANOCYTIC NEVI: ICD-10-CM

## 2022-08-09 DIAGNOSIS — D18.0 HEMANGIOMA: ICD-10-CM

## 2022-08-09 DIAGNOSIS — L82.1 OTHER SEBORRHEIC KERATOSIS: ICD-10-CM

## 2022-08-09 DIAGNOSIS — L81.4 OTHER MELANIN HYPERPIGMENTATION: ICD-10-CM

## 2022-08-09 DIAGNOSIS — L57.8 OTHER SKIN CHANGES DUE TO CHRONIC EXPOSURE TO NONIONIZING RADIATION: ICD-10-CM

## 2022-08-09 PROBLEM — D18.01 HEMANGIOMA OF SKIN AND SUBCUTANEOUS TISSUE: Status: ACTIVE | Noted: 2022-08-09

## 2022-08-09 PROBLEM — D22.5 MELANOCYTIC NEVI OF TRUNK: Status: ACTIVE | Noted: 2022-08-09

## 2022-08-09 PROBLEM — D22.62 MELANOCYTIC NEVI OF LEFT UPPER LIMB, INCLUDING SHOULDER: Status: ACTIVE | Noted: 2022-08-09

## 2022-08-09 PROBLEM — D22.61 MELANOCYTIC NEVI OF RIGHT UPPER LIMB, INCLUDING SHOULDER: Status: ACTIVE | Noted: 2022-08-09

## 2022-08-09 PROCEDURE — ? SUNSCREEN RECOMMENDATIONS

## 2022-08-09 PROCEDURE — 99203 OFFICE O/P NEW LOW 30 MIN: CPT

## 2022-08-09 PROCEDURE — ? COUNSELING

## 2022-08-09 ASSESSMENT — LOCATION DETAILED DESCRIPTION DERM
LOCATION DETAILED: RIGHT VENTRAL DISTAL FOREARM
LOCATION DETAILED: RIGHT ANTERIOR DISTAL UPPER ARM
LOCATION DETAILED: LEFT INFERIOR MEDIAL MIDBACK
LOCATION DETAILED: INFERIOR LUMBAR SPINE
LOCATION DETAILED: LEFT ANTERIOR SHOULDER
LOCATION DETAILED: EPIGASTRIC SKIN
LOCATION DETAILED: LEFT VENTRAL DISTAL FOREARM
LOCATION DETAILED: LEFT PROXIMAL POSTERIOR UPPER ARM
LOCATION DETAILED: LEFT ANTERIOR PROXIMAL UPPER ARM
LOCATION DETAILED: RIGHT DISTAL DORSAL FOREARM

## 2022-08-09 ASSESSMENT — LOCATION SIMPLE DESCRIPTION DERM
LOCATION SIMPLE: LEFT POSTERIOR UPPER ARM
LOCATION SIMPLE: LEFT SHOULDER
LOCATION SIMPLE: RIGHT UPPER ARM
LOCATION SIMPLE: LEFT FOREARM
LOCATION SIMPLE: LEFT UPPER ARM
LOCATION SIMPLE: LEFT LOWER BACK
LOCATION SIMPLE: RIGHT FOREARM
LOCATION SIMPLE: ABDOMEN
LOCATION SIMPLE: LOWER BACK

## 2022-08-09 ASSESSMENT — LOCATION ZONE DERM
LOCATION ZONE: TRUNK
LOCATION ZONE: ARM

## 2024-08-13 ENCOUNTER — TELEPHONE (OUTPATIENT)
Age: 64
End: 2024-08-13